# Patient Record
(demographics unavailable — no encounter records)

---

## 2024-10-08 NOTE — HISTORY OF PRESENT ILLNESS
[FreeTextEntry1] : pt here for a few days of dizziness. no syncopal episodes or pre-syncopal. no f/c/n/v. not positional or orthostatic. HR wnl and patient feels well otherwise. no sick contracts or recent travels. no heavy bleeding, now normal lochia since hospital.Hb on discharge 9.7

## 2024-10-31 NOTE — HISTORY OF PRESENT ILLNESS
[Postpartum Follow Up] : postpartum follow up [] : delivered by vaginal delivery [Breastfeeding] : currently nursing [BF with Difficulty] : nursing without difficulty [Back to Normal] : is back to normal in size [Normal] : the vagina was normal [Cervix Sample Taken] : cervical sample taken for a Pap smear [Examination Of The Breasts] : breasts are normal [None] : None [de-identified] : normal exam PHQ2 neg. pt feeling well, no desire for contraception at this time [de-identified] : n /v 1 yr or PRN